# Patient Record
Sex: FEMALE | Race: WHITE
[De-identification: names, ages, dates, MRNs, and addresses within clinical notes are randomized per-mention and may not be internally consistent; named-entity substitution may affect disease eponyms.]

---

## 2023-06-05 PROBLEM — Z00.00 ENCOUNTER FOR PREVENTIVE HEALTH EXAMINATION: Status: ACTIVE | Noted: 2023-06-05

## 2023-06-07 ENCOUNTER — NON-APPOINTMENT (OUTPATIENT)
Age: 43
End: 2023-06-07

## 2023-06-07 ENCOUNTER — APPOINTMENT (OUTPATIENT)
Dept: CARDIOLOGY | Facility: CLINIC | Age: 43
End: 2023-06-07
Payer: COMMERCIAL

## 2023-06-07 VITALS
HEIGHT: 69 IN | SYSTOLIC BLOOD PRESSURE: 173 MMHG | BODY MASS INDEX: 42.21 KG/M2 | OXYGEN SATURATION: 98 % | WEIGHT: 285 LBS | DIASTOLIC BLOOD PRESSURE: 68 MMHG | HEART RATE: 78 BPM

## 2023-06-07 DIAGNOSIS — Z86.39 PERSONAL HISTORY OF OTHER ENDOCRINE, NUTRITIONAL AND METABOLIC DISEASE: ICD-10-CM

## 2023-06-07 DIAGNOSIS — I10 ESSENTIAL (PRIMARY) HYPERTENSION: ICD-10-CM

## 2023-06-07 DIAGNOSIS — Z82.49 FAMILY HISTORY OF ISCHEMIC HEART DISEASE AND OTHER DISEASES OF THE CIRCULATORY SYSTEM: ICD-10-CM

## 2023-06-07 DIAGNOSIS — Z87.59 PERSONAL HISTORY OF OTHER COMPLICATIONS OF PREGNANCY, CHILDBIRTH AND THE PUERPERIUM: ICD-10-CM

## 2023-06-07 DIAGNOSIS — Z78.9 OTHER SPECIFIED HEALTH STATUS: ICD-10-CM

## 2023-06-07 DIAGNOSIS — R06.02 SHORTNESS OF BREATH: ICD-10-CM

## 2023-06-07 DIAGNOSIS — E66.9 OBESITY, UNSPECIFIED: ICD-10-CM

## 2023-06-07 DIAGNOSIS — Z86.79 PERSONAL HISTORY OF OTHER DISEASES OF THE CIRCULATORY SYSTEM: ICD-10-CM

## 2023-06-07 DIAGNOSIS — Z82.5 FAMILY HISTORY OF ASTHMA AND OTHER CHRONIC LOWER RESPIRATORY DISEASES: ICD-10-CM

## 2023-06-07 PROCEDURE — 36415 COLL VENOUS BLD VENIPUNCTURE: CPT

## 2023-06-07 PROCEDURE — 99204 OFFICE O/P NEW MOD 45 MIN: CPT

## 2023-06-07 PROCEDURE — 93000 ELECTROCARDIOGRAM COMPLETE: CPT

## 2023-06-08 PROBLEM — Z87.59 HISTORY OF PRE-ECLAMPSIA: Status: RESOLVED | Noted: 2023-06-08 | Resolved: 2023-06-08

## 2023-06-08 PROBLEM — Z86.79 HISTORY OF HYPERTENSION: Status: RESOLVED | Noted: 2023-06-08 | Resolved: 2023-06-08

## 2023-06-09 PROBLEM — R06.02 SHORTNESS OF BREATH: Status: ACTIVE | Noted: 2023-06-07

## 2023-06-09 PROBLEM — Z86.39 HISTORY OF OBESITY: Status: RESOLVED | Noted: 2023-06-09 | Resolved: 2023-06-09

## 2023-06-09 PROBLEM — Z82.5 FAMILY HISTORY OF CHRONIC OBSTRUCTIVE PULMONARY DISEASE: Status: ACTIVE | Noted: 2023-06-09

## 2023-06-09 PROBLEM — Z78.9 SOCIAL ALCOHOL USE: Status: ACTIVE | Noted: 2023-06-09

## 2023-06-09 PROBLEM — E66.9 OBESITY: Status: ACTIVE | Noted: 2023-06-09

## 2023-06-09 PROBLEM — Z86.39 HISTORY OF HYPOTHYROIDISM: Status: RESOLVED | Noted: 2023-06-09 | Resolved: 2023-06-09

## 2023-06-09 PROBLEM — Z82.49 FAMILY HISTORY OF HYPERTENSION: Status: ACTIVE | Noted: 2023-06-09

## 2023-06-09 PROBLEM — I10 HYPERTENSION: Status: ACTIVE | Noted: 2023-06-07

## 2023-06-09 RX ORDER — LABETALOL HYDROCHLORIDE 300 MG/1
TABLET, FILM COATED ORAL
Refills: 0 | Status: ACTIVE | COMMUNITY

## 2023-06-09 RX ORDER — LEVOTHYROXINE SODIUM 137 UG/1
TABLET ORAL
Refills: 0 | Status: ACTIVE | COMMUNITY

## 2023-06-09 NOTE — DISCUSSION/SUMMARY
[FreeTextEntry1] : Shortness of breath\par The working diagnosis is dyspnea secondary to obesity and deconditioning.  The history is compelling for this diagnosis.  The lack of chest pain and unremarkable resting 6/23 electrocardiogram argue against myocardial ischemia/atherosclerotic heart disease as a cause for the patient's symptoms..  Noninvasive cardiac studies would be helpful for further evaluation.\par \par I have recommended the following\par a.  Low-salt low-fat low-cholesterol heart healthy diet.  Regular aerobic exercise and weight loss\par b.  Prior cardiac records not available at this time are requested for review\par c.  Echocardiogram\par d.  Exercise treadmill ECG study\par e   fasting lipid profile\par \par \par Hypertension\par Hypertension is not adequately controlled despite the present medical regimen.  Elevation of the blood pressure on initial examination today  (173/68 mmHg) may in part be related to a white coat effect.  The most recent guidelines from the American College of cardiology/American Heart Association have lowered the threshold for initiation or intensification of antihypertensive medical intervention for the treatment of hypertension.\par \par I have recommended the following\par a.  Low-salt low-fat low-cholesterol heart healthy diet.  Regular aerobic exercise and weight loss\par b.  Lisinopril 10 mg/day added to the present medical regimen with further dose adjustment dictated by tolerance and response\par c.  Renal function tests and electrolytes 2 weeks after initiation of treatment\par d.  Home blood pressure monitoring\par \par \par \par Obesity\par Obesity exacerbates  Hamida's  cardiovascular issues.  Today Mrs. Tom  is 5 feet 9 inches tall and weighs 285 pounds .  Diet exercise  weight loss and nutrition consultation are advised. \par \par \par \par The diagnosis, prognosis, risks, options and alternatives were explained at length to the patient.  All questions were answered.  Issues discussed included shortness of breath hypertension antihypertensive medical therapy obesity atherosclerotic heart disease noninvasive cardiac testing diet exercise and weight loss.

## 2023-06-09 NOTE — HISTORY OF PRESENT ILLNESS
[FreeTextEntry1] : 42-year-old female\par Cardiology consultation requested because of hypertension\par \latoya Mei has no known heart disease.  There is no history of a myocardial infarction angina congestive heart failure or cerebrovascular accident.\par \par During her first pregnancy in 2019 she developed preeclampsia and had evidence of left ventricular hypertrophy on echocardiogram.  She was treated with Procardia and labetalol.  Subsequently in repeat echocardiogram showed resolution of the previously described left ventricular hypertrophy.  Those studies are not available for review.  Following her second and last pregnancy she continued to have hypertension and was treated with labetalol.   Most recently blood pressure levels have been elevated despite the present treatment with labetalol 300 twice daily.  Blood pressure levels have been as high as 170/95 mmHg.\par \par About 1 week ago she experienced an episode of shortness of breath at rest with an oxygen saturation as low as 80.  The symptoms abated spontaneously with normalization of the oxygen level.\par \par There is a prior history of obesity.  There is no history of smoking diabetes or illicit drug use.\par \par Her mother has hypertension.  There is no family history of a myocardial infarction or sudden death\par \par Mrs. Tom resents today for cardiovascular evaluation.\par \par Hamida has requested a referral for a primary care physician I have recommended Dr. WAQAR Velázquez

## 2023-06-09 NOTE — PHYSICAL EXAM
[Normal Conjunctiva] : normal conjunctiva [Normal S1, S2] : normal S1, S2 [Clear Lung Fields] : clear lung fields [Soft] : abdomen soft [Non Tender] : non-tender [Normal Bowel Sounds] : normal bowel sounds [Normal Gait] : normal gait [No Rash] : no rash [No Focal Deficits] : no focal deficits [de-identified] : Appears "big" in no distress lying flat [de-identified] : Normocephalic [de-identified] : No neck vein distention.  No carotid bruit.  Thyroid not palpable [de-identified] : Full range of motion [de-identified] : No peripheral edema.  Dorsalis pedis pulses +2 bilaterally.  Feet warm and well-perfused no ulcerations. [de-identified] : Pleasant.

## 2023-06-09 NOTE — REVIEW OF SYSTEMS
[Feeling Fatigued] : feeling fatigued [Joint Pain] : joint pain [Negative] : Psychiatric [FreeTextEntry5] : See history of present illness

## 2023-07-18 ENCOUNTER — APPOINTMENT (OUTPATIENT)
Dept: CARDIOLOGY | Facility: CLINIC | Age: 43
End: 2023-07-18

## 2023-08-15 ENCOUNTER — RX CHANGE (OUTPATIENT)
Age: 43
End: 2023-08-15

## 2023-08-15 RX ORDER — LISINOPRIL 10 MG/1
10 TABLET ORAL
Qty: 30 | Refills: 3 | Status: DISCONTINUED | COMMUNITY
Start: 2023-07-14 | End: 2023-08-15

## 2023-08-15 RX ORDER — LISINOPRIL 10 MG/1
10 TABLET ORAL
Qty: 90 | Refills: 2 | Status: ACTIVE | COMMUNITY
Start: 1900-01-01 | End: 1900-01-01